# Patient Record
Sex: MALE | Race: OTHER | Employment: UNEMPLOYED | URBAN - METROPOLITAN AREA
[De-identification: names, ages, dates, MRNs, and addresses within clinical notes are randomized per-mention and may not be internally consistent; named-entity substitution may affect disease eponyms.]

---

## 2018-06-03 ENCOUNTER — HOSPITAL ENCOUNTER (EMERGENCY)
Facility: HOSPITAL | Age: 18
Discharge: HOME | End: 2018-06-03
Attending: EMERGENCY MEDICINE
Payer: MEDICAID

## 2018-06-03 ENCOUNTER — APPOINTMENT (OUTPATIENT)
Dept: CT IMAGING | Facility: HOSPITAL | Age: 18
End: 2018-06-03
Attending: EMERGENCY MEDICINE
Payer: MEDICAID

## 2018-06-03 VITALS
HEIGHT: 67 IN | SYSTOLIC BLOOD PRESSURE: 111 MMHG | RESPIRATION RATE: 16 BRPM | OXYGEN SATURATION: 97 % | HEART RATE: 80 BPM | DIASTOLIC BLOOD PRESSURE: 68 MMHG | BODY MASS INDEX: 23.54 KG/M2 | WEIGHT: 150 LBS | TEMPERATURE: 98 F

## 2018-06-03 DIAGNOSIS — J01.00 ACUTE NON-RECURRENT MAXILLARY SINUSITIS: Primary | ICD-10-CM

## 2018-06-03 PROCEDURE — 96375 TX/PRO/DX INJ NEW DRUG ADDON: CPT

## 2018-06-03 PROCEDURE — 96374 THER/PROPH/DIAG INJ IV PUSH: CPT

## 2018-06-03 PROCEDURE — 99284 EMERGENCY DEPT VISIT MOD MDM: CPT

## 2018-06-03 PROCEDURE — 70450 CT HEAD/BRAIN W/O DYE: CPT | Performed by: EMERGENCY MEDICINE

## 2018-06-03 PROCEDURE — 87430 STREP A AG IA: CPT

## 2018-06-03 PROCEDURE — 96361 HYDRATE IV INFUSION ADD-ON: CPT

## 2018-06-03 RX ORDER — DIPHENHYDRAMINE HYDROCHLORIDE 50 MG/ML
25 INJECTION INTRAMUSCULAR; INTRAVENOUS ONCE
Status: COMPLETED | OUTPATIENT
Start: 2018-06-03 | End: 2018-06-03

## 2018-06-03 RX ORDER — FLUTICASONE PROPIONATE 50 MCG
2 SPRAY, SUSPENSION (ML) NASAL DAILY
Qty: 16 G | Refills: 0 | Status: SHIPPED | OUTPATIENT
Start: 2018-06-03 | End: 2018-07-03

## 2018-06-03 RX ORDER — KETOROLAC TROMETHAMINE 15 MG/ML
15 INJECTION, SOLUTION INTRAMUSCULAR; INTRAVENOUS ONCE
Status: COMPLETED | OUTPATIENT
Start: 2018-06-03 | End: 2018-06-03

## 2018-06-03 RX ORDER — METOCLOPRAMIDE HYDROCHLORIDE 5 MG/ML
10 INJECTION INTRAMUSCULAR; INTRAVENOUS ONCE
Status: COMPLETED | OUTPATIENT
Start: 2018-06-03 | End: 2018-06-03

## 2018-06-03 RX ORDER — AMOXICILLIN 500 MG/1
500 TABLET, FILM COATED ORAL 3 TIMES DAILY
Qty: 30 TABLET | Refills: 0 | Status: SHIPPED | OUTPATIENT
Start: 2018-06-03 | End: 2018-06-13

## 2018-06-03 RX ORDER — MIRTAZAPINE 30 MG/1
30 TABLET, FILM COATED ORAL NIGHTLY
COMMUNITY
End: 2020-11-10

## 2018-06-03 NOTE — ED NOTES
Pt states the headache came on yesterday morning when he woke up and it has gradually gotten worse to the point he feels like he can't \"even think straight\". Pt took motrin but had no relief.

## 2018-06-03 NOTE — ED PROVIDER NOTES
Patient Seen in: [facility] AND [facility] Emergency Department    History   Patient presents with:  Headache (neurologic)    Stated Complaint: Headache    HPI    38-year-old male presents emergency department with headache that began at about noon and has progr Lymphadenopathy:     He has no cervical adenopathy. Neurological: He is alert and oriented to person, place, and time. No cranial nerve deficit. Skin: Skin is warm and dry. Psychiatric: He has a normal mood and affect.  His behavior is normal.   Gigi

## 2018-07-31 ENCOUNTER — HOSPITAL ENCOUNTER (EMERGENCY)
Facility: HOSPITAL | Age: 18
Discharge: HOME | End: 2018-08-01
Attending: EMERGENCY MEDICINE
Payer: MEDICAID

## 2018-07-31 ENCOUNTER — APPOINTMENT (OUTPATIENT)
Dept: CT IMAGING | Facility: HOSPITAL | Age: 18
End: 2018-07-31
Attending: EMERGENCY MEDICINE
Payer: MEDICAID

## 2018-07-31 DIAGNOSIS — R59.1 LAD (LYMPHADENOPATHY): Primary | ICD-10-CM

## 2018-07-31 LAB
ANION GAP SERPL CALC-SCNC: 7 MMOL/L (ref 0–18)
BASOPHILS # BLD: 0 K/UL (ref 0–0.2)
BASOPHILS NFR BLD: 0 %
BUN SERPL-MCNC: 10 MG/DL (ref 8–20)
BUN/CREAT SERPL: 12.2 (ref 10–20)
CALCIUM SERPL-MCNC: 9 MG/DL (ref 8.5–10.5)
CHLORIDE SERPL-SCNC: 102 MMOL/L (ref 95–110)
CO2 SERPL-SCNC: 27 MMOL/L (ref 22–32)
CREAT SERPL-MCNC: 0.82 MG/DL (ref 0.5–1.5)
EOSINOPHIL # BLD: 0.1 K/UL (ref 0–0.7)
EOSINOPHIL NFR BLD: 1 %
ERYTHROCYTE [DISTWIDTH] IN BLOOD BY AUTOMATED COUNT: 14 % (ref 11–15)
GLUCOSE SERPL-MCNC: 89 MG/DL (ref 70–99)
HCT VFR BLD AUTO: 48.4 % (ref 41–52)
HGB BLD-MCNC: 16.1 G/DL (ref 13.5–17.5)
LYMPHOCYTES # BLD: 2.1 K/UL (ref 1–4)
LYMPHOCYTES NFR BLD: 21 %
MCH RBC QN AUTO: 29.9 PG (ref 27–32)
MCHC RBC AUTO-ENTMCNC: 33.3 G/DL (ref 32–37)
MCV RBC AUTO: 90 FL (ref 80–100)
MONOCYTES # BLD: 0.7 K/UL (ref 0–1)
MONOCYTES NFR BLD: 7 %
NEUTROPHILS # BLD AUTO: 7.1 K/UL (ref 1.8–7.7)
NEUTROPHILS NFR BLD: 71 %
OSMOLALITY UR CALC.SUM OF ELEC: 281 MOSM/KG (ref 275–295)
PLATELET # BLD AUTO: 217 K/UL (ref 140–400)
PMV BLD AUTO: 9.6 FL (ref 7.4–10.3)
POTASSIUM SERPL-SCNC: 3.9 MMOL/L (ref 3.3–5.1)
RBC # BLD AUTO: 5.37 M/UL (ref 4.5–5.9)
SODIUM SERPL-SCNC: 136 MMOL/L (ref 136–144)
WBC # BLD AUTO: 10 K/UL (ref 4–11)

## 2018-07-31 PROCEDURE — 99284 EMERGENCY DEPT VISIT MOD MDM: CPT

## 2018-07-31 PROCEDURE — 85025 COMPLETE CBC W/AUTO DIFF WBC: CPT | Performed by: EMERGENCY MEDICINE

## 2018-07-31 PROCEDURE — 80048 BASIC METABOLIC PNL TOTAL CA: CPT | Performed by: EMERGENCY MEDICINE

## 2018-07-31 PROCEDURE — 70491 CT SOFT TISSUE NECK W/DYE: CPT | Performed by: EMERGENCY MEDICINE

## 2018-07-31 PROCEDURE — 36415 COLL VENOUS BLD VENIPUNCTURE: CPT

## 2018-07-31 RX ORDER — IBUPROFEN 600 MG/1
600 TABLET ORAL EVERY 8 HOURS PRN
Qty: 20 TABLET | Refills: 0 | Status: SHIPPED | OUTPATIENT
Start: 2018-07-31 | End: 2018-08-07

## 2018-07-31 RX ORDER — ACETAMINOPHEN 500 MG
1000 TABLET ORAL ONCE
Status: COMPLETED | OUTPATIENT
Start: 2018-07-31 | End: 2018-07-31

## 2018-08-01 VITALS
OXYGEN SATURATION: 97 % | RESPIRATION RATE: 18 BRPM | SYSTOLIC BLOOD PRESSURE: 112 MMHG | HEART RATE: 76 BPM | BODY MASS INDEX: 24 KG/M2 | DIASTOLIC BLOOD PRESSURE: 70 MMHG | TEMPERATURE: 98 F | WEIGHT: 155 LBS

## 2018-08-01 NOTE — ED PROVIDER NOTES
Patient Seen in: [facility] AND [facility] Emergency Department    History   Patient presents with:  Rash Skin Problem (integumentary)  Swelling Edema (cardiovascular, metabolic)    Stated Complaint: lump on throat, rash    HPI    24-year-old male with no signif stated complaint: lump on throat, rash  Other systems are as noted in HPI. Constitutional and vital signs reviewed. All other systems reviewed and negative except as noted above.     Physical Exam   ED Triage Vitals [07/31/18 2141]  BP: 120/72  Pulse: is 99%, indicating adequate oxygenation.     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 Case discussed with Dr. Nazia Pérez at 1:15 AM EDT. Silverio Martinez M.D.   This report has been electronically signed and verified by the Radiologist whose name is printed ab Prescribed:  Current Discharge Medication List    START taking these medications    ibuprofen 600 MG Oral Tab  Take 1 tablet (600 mg total) by mouth every 8 (eight) hours as needed.   Qty: 20 tablet Refills: 0

## 2018-08-01 NOTE — ED INITIAL ASSESSMENT (HPI)
Triage:  Patient c/o multiple new nodules on neck and head. States he has a lump on throat/side of neck, behind his R ear and back of head. C/o headaches. Also with diffuse rash for the past 1-2 weeks.

## 2018-12-29 ENCOUNTER — HOSPITAL ENCOUNTER (EMERGENCY)
Facility: HOSPITAL | Age: 18
Discharge: HOME | End: 2018-12-29
Attending: EMERGENCY MEDICINE
Payer: MEDICAID

## 2018-12-29 ENCOUNTER — APPOINTMENT (OUTPATIENT)
Dept: GENERAL RADIOLOGY | Facility: HOSPITAL | Age: 18
End: 2018-12-29
Attending: EMERGENCY MEDICINE
Payer: MEDICAID

## 2018-12-29 VITALS
BODY MASS INDEX: 24.48 KG/M2 | WEIGHT: 156 LBS | HEIGHT: 67 IN | OXYGEN SATURATION: 96 % | TEMPERATURE: 99 F | HEART RATE: 116 BPM | DIASTOLIC BLOOD PRESSURE: 77 MMHG | SYSTOLIC BLOOD PRESSURE: 112 MMHG | RESPIRATION RATE: 18 BRPM

## 2018-12-29 DIAGNOSIS — J40 BRONCHITIS: Primary | ICD-10-CM

## 2018-12-29 LAB — S PYO AG THROAT QL: NEGATIVE

## 2018-12-29 PROCEDURE — 99284 EMERGENCY DEPT VISIT MOD MDM: CPT

## 2018-12-29 PROCEDURE — 94640 AIRWAY INHALATION TREATMENT: CPT

## 2018-12-29 PROCEDURE — 87430 STREP A AG IA: CPT

## 2018-12-29 PROCEDURE — 71046 X-RAY EXAM CHEST 2 VIEWS: CPT | Performed by: EMERGENCY MEDICINE

## 2018-12-29 RX ORDER — BENZONATATE 100 MG/1
100 CAPSULE ORAL 3 TIMES DAILY PRN
Qty: 42 CAPSULE | Refills: 0 | Status: SHIPPED | OUTPATIENT
Start: 2018-12-29 | End: 2019-01-12

## 2018-12-29 RX ORDER — IPRATROPIUM BROMIDE AND ALBUTEROL SULFATE 2.5; .5 MG/3ML; MG/3ML
3 SOLUTION RESPIRATORY (INHALATION) ONCE
Status: COMPLETED | OUTPATIENT
Start: 2018-12-29 | End: 2018-12-29

## 2018-12-29 RX ORDER — ACETAMINOPHEN AND CODEINE PHOSPHATE 300; 30 MG/1; MG/1
1 TABLET ORAL ONCE
Status: COMPLETED | OUTPATIENT
Start: 2018-12-29 | End: 2018-12-29

## 2018-12-29 RX ORDER — LIDOCAINE HYDROCHLORIDE 10 MG/ML
5 INJECTION, SOLUTION EPIDURAL; INFILTRATION; INTRACAUDAL; PERINEURAL ONCE
Status: COMPLETED | OUTPATIENT
Start: 2018-12-29 | End: 2018-12-29

## 2018-12-29 RX ORDER — PREDNISONE 20 MG/1
40 TABLET ORAL DAILY
Qty: 10 TABLET | Refills: 0 | Status: SHIPPED | OUTPATIENT
Start: 2018-12-29 | End: 2019-01-03

## 2018-12-29 RX ORDER — ALBUTEROL SULFATE 90 UG/1
2 AEROSOL, METERED RESPIRATORY (INHALATION) EVERY 4 HOURS PRN
Qty: 1 INHALER | Refills: 0 | Status: SHIPPED | OUTPATIENT
Start: 2018-12-29 | End: 2019-01-28

## 2018-12-29 RX ORDER — ACETAMINOPHEN AND CODEINE PHOSPHATE 300; 30 MG/1; MG/1
1 TABLET ORAL EVERY 8 HOURS PRN
Qty: 15 TABLET | Refills: 0 | Status: SHIPPED | OUTPATIENT
Start: 2018-12-29 | End: 2019-01-03

## 2018-12-30 NOTE — ED PROVIDER NOTES
Patient Seen in: [facility] AND [facility] Emergency Department    History   Patient presents with:  Cough/URI    Stated Complaint: cough     HPI    18 yo M without PMH aside from prior tobacco use presneitng with 1+ week of intermittently productive cough/dyspnea Eyes: No injection. Neck: Neck supple. Cardiovascular: Tachycardic. Pulmonary/Chest: Effort normal. Diffuse expiratory wheezing and bronchospastic cough without respiratory distress. Abdominal: Soft. Nontender  Musculoskeletal: No gross deformity. [id number]  Call  For followup and re-evaluation. Deanna Taveras [address] [place] [address]  [phone]    Call  For primary care followup, re-evaluation and to establish care.       Medications Prescribed:  Discharge M

## 2018-12-30 NOTE — ED INITIAL ASSESSMENT (HPI)
Pt to ER with c/o persistent cough for one week. Pt states he finished course of Amoxicillin. Pt c/o intermittent fever of 101. Pt c/o back pain with cough and post tussive emesis. Lungs clear bilaterally. 98% on room air.

## 2018-12-30 NOTE — ED NOTES
Pt c/o cough, fevers, body aches, and sore throat x2wk. Pt states that he was taking amoxicillin for the past week for diarrhea, and has finished the course. Pt denies taking any medication for his sx today. Pt has dry cough while RN is in the room.  When n

## 2019-01-02 NOTE — ED NOTES
Pt provided with discharge instructions and prescriptions. Verbalized understanding for plan of care at home and follow up. All questions/concerns addressed prior to discharge.    Pt ambulatory out of er with steady gait Detail Level: Detailed General Sunscreen Counseling: I recommended a broad spectrum sunscreen with a SPF of 30 or higher.  I explained that SPF 30 sunscreens block approximately 97 percent of the sun's harmful rays.  Sunscreens should be applied at least 15 minutes prior to expected sun exposure and then every 2 hours after that as long as sun exposure continues. If swimming or exercising sunscreen should be reapplied every 45 minutes to an hour after getting wet or sweating.  One ounce, or the equivalent of a shot glass full of sunscreen, is adequate to protect the skin not covered by a bathing suit. I also recommended a lip balm with a sunscreen as well. Sun protective clothing can be used in lieu of sunscreen but must be worn the entire time you are exposed to the sun's rays.

## 2019-08-06 ENCOUNTER — HOSPITAL ENCOUNTER (EMERGENCY)
Facility: HOSPITAL | Age: 19
Discharge: HOME | End: 2019-08-06
Attending: EMERGENCY MEDICINE
Payer: MEDICAID

## 2019-08-06 VITALS
HEART RATE: 78 BPM | WEIGHT: 150 LBS | OXYGEN SATURATION: 100 % | DIASTOLIC BLOOD PRESSURE: 69 MMHG | RESPIRATION RATE: 20 BRPM | TEMPERATURE: 99 F | HEIGHT: 67 IN | BODY MASS INDEX: 23.54 KG/M2 | SYSTOLIC BLOOD PRESSURE: 127 MMHG

## 2019-08-06 DIAGNOSIS — S22.42XD CLOSED FRACTURE OF MULTIPLE RIBS OF LEFT SIDE WITH ROUTINE HEALING, SUBSEQUENT ENCOUNTER: Primary | ICD-10-CM

## 2019-08-06 PROCEDURE — 99283 EMERGENCY DEPT VISIT LOW MDM: CPT

## 2019-08-06 PROCEDURE — 96372 THER/PROPH/DIAG INJ SC/IM: CPT

## 2019-08-06 RX ORDER — LIDOCAINE 50 MG/G
1 PATCH TOPICAL EVERY 24 HOURS
Qty: 3 PATCH | Refills: 0 | Status: SHIPPED | OUTPATIENT
Start: 2019-08-06 | End: 2019-08-09

## 2019-08-06 RX ORDER — IBUPROFEN 600 MG/1
600 TABLET ORAL EVERY 6 HOURS PRN
Qty: 30 TABLET | Refills: 0 | Status: SHIPPED | OUTPATIENT
Start: 2019-08-06 | End: 2019-08-13

## 2019-08-06 RX ORDER — HYDROCODONE BITARTRATE AND ACETAMINOPHEN 5; 325 MG/1; MG/1
1-2 TABLET ORAL EVERY 6 HOURS PRN
Qty: 30 TABLET | Refills: 0 | Status: SHIPPED | OUTPATIENT
Start: 2019-08-06 | End: 2019-08-13

## 2019-08-06 RX ORDER — MORPHINE SULFATE 4 MG/ML
10 INJECTION, SOLUTION INTRAMUSCULAR; INTRAVENOUS ONCE
Status: COMPLETED | OUTPATIENT
Start: 2019-08-06 | End: 2019-08-06

## 2019-08-06 RX ORDER — IBUPROFEN 600 MG/1
600 TABLET ORAL ONCE
Status: COMPLETED | OUTPATIENT
Start: 2019-08-06 | End: 2019-08-06

## 2019-08-06 RX ORDER — MORPHINE SULFATE 4 MG/ML
10 INJECTION, SOLUTION INTRAMUSCULAR; INTRAVENOUS ONCE
Status: DISCONTINUED | OUTPATIENT
Start: 2019-08-06 | End: 2019-08-06

## 2019-08-06 NOTE — ED INITIAL ASSESSMENT (HPI)
Pt with recent left rib fracture and pneumothorax. Dc from hospital Friday with [place]. Pt ran out of PaperV last night. C/o left sided rib pain. Denies sob, speaking in full clear sentences. LS clear bilaterally.

## 2019-08-06 NOTE — ED PROVIDER NOTES
Patient Seen in: [facility] AND [facility] Emergency Department    History   Patient presents with:  Pain (neurologic)    Stated Complaint: Pain from Rib fract.     HPI    21 yo male was recently admitted to outside hospital with left sided rib fractures and pneu Bowel sounds are normal. He exhibits no distension and no mass. There is no tenderness. There is no rebound and no guarding. Musculoskeletal: Normal range of motion. He exhibits no edema or tenderness.    Lymphadenopathy:     He has no cervical adenopathy

## 2020-04-15 ENCOUNTER — HOSPITAL ENCOUNTER (EMERGENCY)
Facility: HOSPITAL | Age: 20
Discharge: HOME | End: 2020-04-15
Attending: EMERGENCY MEDICINE
Payer: MEDICAID

## 2020-04-15 VITALS
SYSTOLIC BLOOD PRESSURE: 106 MMHG | HEART RATE: 69 BPM | BODY MASS INDEX: 23.49 KG/M2 | RESPIRATION RATE: 20 BRPM | WEIGHT: 155 LBS | OXYGEN SATURATION: 97 % | HEIGHT: 68 IN | DIASTOLIC BLOOD PRESSURE: 74 MMHG | TEMPERATURE: 98 F

## 2020-04-15 DIAGNOSIS — G47.00 INSOMNIA, UNSPECIFIED TYPE: ICD-10-CM

## 2020-04-15 DIAGNOSIS — R20.2 PARESTHESIAS: ICD-10-CM

## 2020-04-15 DIAGNOSIS — R00.2 PALPITATIONS: Primary | ICD-10-CM

## 2020-04-15 PROCEDURE — 93010 ELECTROCARDIOGRAM REPORT: CPT | Performed by: EMERGENCY MEDICINE

## 2020-04-15 PROCEDURE — 84443 ASSAY THYROID STIM HORMONE: CPT | Performed by: EMERGENCY MEDICINE

## 2020-04-15 PROCEDURE — 99283 EMERGENCY DEPT VISIT LOW MDM: CPT

## 2020-04-15 PROCEDURE — 85025 COMPLETE CBC W/AUTO DIFF WBC: CPT | Performed by: EMERGENCY MEDICINE

## 2020-04-15 PROCEDURE — 80048 BASIC METABOLIC PNL TOTAL CA: CPT | Performed by: EMERGENCY MEDICINE

## 2020-04-15 PROCEDURE — 36415 COLL VENOUS BLD VENIPUNCTURE: CPT

## 2020-04-15 PROCEDURE — 93005 ELECTROCARDIOGRAM TRACING: CPT

## 2020-04-15 NOTE — ED INITIAL ASSESSMENT (HPI)
Heart palpitations 2 days ago; Patient report \"everytime I close my eyes my hands get numb and I feel like I'm going to pass out. \" Also reports dizziness when he gets up.

## 2020-04-15 NOTE — ED PROVIDER NOTES
Patient Seen in: [facility] Emergency Department    History   Patient presents with:  Lightheadedness  Arrythmia/Palpitations    Stated Complaint: palpitations, lightheaded    HPI    Patient is here with complaint of palpitations he states for the Wt 70.3 kg   SpO2 98%   BMI 23.57 kg/m²         Physical Exam  Constitutional:  Alert, well nourished adult lying in bed in no distress. Vital signs noted. Eye:  No scleral icterus. Eyelids appear normal, no lesions.   Cardiovascular:  Normal S1 and S2 order CBC WITH DIFFERENTIAL WITH PLATELET.   Procedure                               Abnormality         Status                     ---------                               -----------         ------                     CBC W/ DIFFERENTIAL[[id number]]

## 2020-05-15 ENCOUNTER — HOSPITAL ENCOUNTER (EMERGENCY)
Facility: HOSPITAL | Age: 20
Discharge: HOME | End: 2020-05-15
Attending: EMERGENCY MEDICINE
Payer: MEDICAID

## 2020-05-15 VITALS
SYSTOLIC BLOOD PRESSURE: 137 MMHG | TEMPERATURE: 100 F | HEIGHT: 68 IN | RESPIRATION RATE: 20 BRPM | HEART RATE: 104 BPM | WEIGHT: 150 LBS | DIASTOLIC BLOOD PRESSURE: 81 MMHG | BODY MASS INDEX: 22.73 KG/M2 | OXYGEN SATURATION: 98 %

## 2020-05-15 DIAGNOSIS — Z20.822 CLOSE EXPOSURE TO COVID-19 VIRUS: Primary | ICD-10-CM

## 2020-05-15 PROCEDURE — 87430 STREP A AG IA: CPT

## 2020-05-15 PROCEDURE — 99283 EMERGENCY DEPT VISIT LOW MDM: CPT

## 2020-05-15 RX ORDER — ACETAMINOPHEN 500 MG
1000 TABLET ORAL ONCE
Status: COMPLETED | OUTPATIENT
Start: 2020-05-15 | End: 2020-05-15

## 2020-05-15 NOTE — ED INITIAL ASSESSMENT (HPI)
C/o sore throat, chills, cough since Monday. Pt family member tested +COVID on Sunday. No respiratory distress. Taking Mucinex at home without relief. Mask applied to patient in triage.

## 2020-05-15 NOTE — ED NOTES
Discharge instructions reviewed. Pt verbalized understanding with no further questions. Pain controlled. Steady gait. Speaking in full clear sentences at discharge. Pt aware to self quarantine at home for 14 days.  Pt also aware to have family members who p

## 2020-05-15 NOTE — ED PROVIDER NOTES
Patient Seen in: [facility] AND [facility] Emergency Department    History   Patient presents with:  Sore Throat  Testing    Stated Complaint: sore throat, chills, COVID exposure     HPI    41-year-old male with PMH asthma/smoking presenting for evaluation of sore Cardiovascular: RRR. Pulmonary/Chest: Effort normal. CTAB. Abdominal: Soft. Nontender. Musculoskeletal: No gross deformity. Neurological: Alert. Skin: Skin is warm. Psychiatric: Cooperative. Nursing note and vitals reviewed.         ED Course

## 2020-11-10 ENCOUNTER — HOSPITAL ENCOUNTER (EMERGENCY)
Facility: HOSPITAL | Age: 20
Discharge: HOME | End: 2020-11-10
Attending: EMERGENCY MEDICINE
Payer: MEDICAID

## 2020-11-10 VITALS
RESPIRATION RATE: 17 BRPM | HEIGHT: 68 IN | SYSTOLIC BLOOD PRESSURE: 132 MMHG | WEIGHT: 165 LBS | BODY MASS INDEX: 25.01 KG/M2 | HEART RATE: 87 BPM | DIASTOLIC BLOOD PRESSURE: 86 MMHG | OXYGEN SATURATION: 97 % | TEMPERATURE: 100 F

## 2020-11-10 DIAGNOSIS — U07.1 COVID-19 VIRUS INFECTION: Primary | ICD-10-CM

## 2020-11-10 PROCEDURE — 93010 ELECTROCARDIOGRAM REPORT: CPT | Performed by: EMERGENCY MEDICINE

## 2020-11-10 PROCEDURE — 99283 EMERGENCY DEPT VISIT LOW MDM: CPT

## 2020-11-10 PROCEDURE — 93005 ELECTROCARDIOGRAM TRACING: CPT

## 2020-11-10 RX ORDER — PREDNISONE 20 MG/1
40 TABLET ORAL DAILY
Qty: 10 TABLET | Refills: 0 | Status: SHIPPED | OUTPATIENT
Start: 2020-11-10 | End: 2020-11-15

## 2020-11-11 NOTE — ED INITIAL ASSESSMENT (HPI)
Pt to ED with c/o increasing left sided chest discomfort and dyspnea for 3 days. Pt states he was seen at [facility] last night for same complaint. Pt states they did a chest xray and has a pending COVID test. Pt states hx of asthma. No wheezing noted.  No resp

## 2020-11-11 NOTE — ED PROVIDER NOTES
Patient Seen in: [facility] Emergency Department    History   Patient presents with:  Difficulty Breathing      HPI    The patient presents to the ED complaining of chest discomfort and 3 days of shortness of breath and coughing.   He states that h room. Personal protective equipment including droplet mask, eye protection, and gloves were worn throughout the duration of the exam.  Handwashing was performed prior to and after the exam.  Stethoscope and any equipment used during my examination was william reports. Complicating Factors: The patient already has does not have a problem list on file. to contribute to the complexity of this ED evaluation. ED Course: Patient presents to the ED with COVID-19 symptoms.   Testing at [facility] showing a po

## 2021-09-07 ENCOUNTER — HOSPITAL ENCOUNTER (EMERGENCY)
Facility: HOSPITAL | Age: 21
Discharge: LEFT BEFORE DISCHARGE | End: 2021-09-07
Attending: EMERGENCY MEDICINE
Payer: MEDICAID

## 2021-09-07 ENCOUNTER — APPOINTMENT (OUTPATIENT)
Dept: CT IMAGING | Facility: HOSPITAL | Age: 21
End: 2021-09-07
Attending: EMERGENCY MEDICINE
Payer: MEDICAID

## 2021-09-07 VITALS
OXYGEN SATURATION: 98 % | HEIGHT: 68 IN | BODY MASS INDEX: 19.7 KG/M2 | RESPIRATION RATE: 16 BRPM | TEMPERATURE: 98 F | HEART RATE: 102 BPM | WEIGHT: 130 LBS | DIASTOLIC BLOOD PRESSURE: 72 MMHG | SYSTOLIC BLOOD PRESSURE: 116 MMHG

## 2021-09-07 DIAGNOSIS — E16.2 HYPOGLYCEMIA: ICD-10-CM

## 2021-09-07 DIAGNOSIS — G40.909 SEIZURE DISORDER (HCC): Primary | ICD-10-CM

## 2021-09-07 LAB
ANION GAP SERPL CALC-SCNC: 4 MMOL/L (ref 0–18)
BASOPHILS # BLD AUTO: 0.07 X10(3) UL (ref 0–0.2)
BASOPHILS NFR BLD AUTO: 0.7 %
BUN BLD-MCNC: 16 MG/DL (ref 7–18)
BUN/CREAT SERPL: 18 (ref 10–20)
CALCIUM BLD-MCNC: 8.4 MG/DL (ref 8.5–10.1)
CHLORIDE SERPL-SCNC: 107 MMOL/L (ref 98–112)
CO2 SERPL-SCNC: 30 MMOL/L (ref 21–32)
CREAT BLD-MCNC: 0.89 MG/DL
DEPRECATED RDW RBC AUTO: 41.4 FL (ref 35.1–46.3)
EOSINOPHIL # BLD AUTO: 0.1 X10(3) UL (ref 0–0.7)
EOSINOPHIL NFR BLD AUTO: 1 %
ERYTHROCYTE [DISTWIDTH] IN BLOOD BY AUTOMATED COUNT: 13.1 % (ref 11–15)
GLUCOSE BLD-MCNC: 97 MG/DL (ref 70–99)
GLUCOSE BLDC GLUCOMTR-MCNC: 82 MG/DL (ref 70–99)
GLUCOSE BLDC GLUCOMTR-MCNC: 89 MG/DL (ref 70–99)
HAV IGM SER QL: 2.3 MG/DL (ref 1.6–2.6)
HCT VFR BLD AUTO: 47.1 %
HGB BLD-MCNC: 16.2 G/DL
IMM GRANULOCYTES # BLD AUTO: 0.02 X10(3) UL (ref 0–1)
IMM GRANULOCYTES NFR BLD: 0.2 %
LYMPHOCYTES # BLD AUTO: 2.74 X10(3) UL (ref 1–4)
LYMPHOCYTES NFR BLD AUTO: 28.5 %
MCH RBC QN AUTO: 29.9 PG (ref 26–34)
MCHC RBC AUTO-ENTMCNC: 34.4 G/DL (ref 31–37)
MCV RBC AUTO: 86.9 FL
MONOCYTES # BLD AUTO: 0.6 X10(3) UL (ref 0.1–1)
MONOCYTES NFR BLD AUTO: 6.2 %
NEUTROPHILS # BLD AUTO: 6.1 X10 (3) UL (ref 1.5–7.7)
NEUTROPHILS # BLD AUTO: 6.1 X10(3) UL (ref 1.5–7.7)
NEUTROPHILS NFR BLD AUTO: 63.4 %
OSMOLALITY SERPL CALC.SUM OF ELEC: 293 MOSM/KG (ref 275–295)
PLATELET # BLD AUTO: 280 10(3)UL (ref 150–450)
POTASSIUM SERPL-SCNC: 3.6 MMOL/L (ref 3.5–5.1)
RBC # BLD AUTO: 5.42 X10(6)UL
SODIUM SERPL-SCNC: 141 MMOL/L (ref 136–145)
WBC # BLD AUTO: 9.6 X10(3) UL (ref 4–11)

## 2021-09-07 PROCEDURE — 70496 CT ANGIOGRAPHY HEAD: CPT | Performed by: EMERGENCY MEDICINE

## 2021-09-07 PROCEDURE — 36415 COLL VENOUS BLD VENIPUNCTURE: CPT

## 2021-09-07 PROCEDURE — 93005 ELECTROCARDIOGRAM TRACING: CPT

## 2021-09-07 PROCEDURE — 93010 ELECTROCARDIOGRAM REPORT: CPT | Performed by: EMERGENCY MEDICINE

## 2021-09-07 PROCEDURE — 82962 GLUCOSE BLOOD TEST: CPT

## 2021-09-07 PROCEDURE — 83735 ASSAY OF MAGNESIUM: CPT | Performed by: EMERGENCY MEDICINE

## 2021-09-07 PROCEDURE — 70498 CT ANGIOGRAPHY NECK: CPT | Performed by: EMERGENCY MEDICINE

## 2021-09-07 PROCEDURE — 80048 BASIC METABOLIC PNL TOTAL CA: CPT | Performed by: EMERGENCY MEDICINE

## 2021-09-07 PROCEDURE — 85025 COMPLETE CBC W/AUTO DIFF WBC: CPT | Performed by: EMERGENCY MEDICINE

## 2021-09-07 PROCEDURE — 99284 EMERGENCY DEPT VISIT MOD MDM: CPT

## 2021-09-07 NOTE — ED PROVIDER NOTES
Patient Seen in: [facility] Emergency Department      History   Patient presents with:  Seizure Disorder  Hypoglycemia    Stated Complaint: seizure/hypoglycemia    HPI/Subjective:   HPI    Pt is 18 yo M who arrives by EMS for possible seizure.   Pa PERRL, conjunctiva injected b/l  Neck: supple, non tender, no meningeal signs  CV: tachycardic, no murmurs  Resp: CTAB, no wheezes or retractions  Ab: soft, nontender, no distension  Extremities: FROM of all extremities, no cyanosis/clubbing/edema  Neuro: (CST): Geovanna Cortes MD on 9/07/2021 at 12:24 PM     Finalized by (CST): Geovanna Cortes MD on 9/07/2021 at 12:30 PM            Radiology exams  Viewed and reviewed by myself and findings discussed with patient including need for follow up       Medical Record

## 2021-09-07 NOTE — ED QUICK NOTES
Pt eloped with PIV still in arm. Security and [place] [employer] called. Security able to find pt still on hospital property and return him for IV removal. MD aware.

## 2021-09-07 NOTE — ED INITIAL ASSESSMENT (HPI)
[address] [facility] EMS, patient had a seizure in bed, was post ictal upon medic arrival  Also was noted to be hypoglycemia at 48 , was given dextrose    Awake and alert upon arrival. Denies taking any medications daily

## 2021-11-07 ENCOUNTER — HOSPITAL ENCOUNTER (EMERGENCY)
Facility: HOSPITAL | Age: 21
Discharge: HOME HEALTH | End: 2021-11-07
Attending: EMERGENCY MEDICINE
Payer: MEDICAID

## 2021-11-07 ENCOUNTER — APPOINTMENT (OUTPATIENT)
Dept: CT IMAGING | Facility: HOSPITAL | Age: 21
End: 2021-11-07
Attending: EMERGENCY MEDICINE
Payer: MEDICAID

## 2021-11-07 VITALS
WEIGHT: 136 LBS | SYSTOLIC BLOOD PRESSURE: 113 MMHG | HEART RATE: 96 BPM | OXYGEN SATURATION: 100 % | TEMPERATURE: 97 F | DIASTOLIC BLOOD PRESSURE: 75 MMHG | RESPIRATION RATE: 18 BRPM | BODY MASS INDEX: 20.61 KG/M2 | HEIGHT: 68 IN

## 2021-11-07 DIAGNOSIS — R10.30 INGUINAL PAIN, UNSPECIFIED LATERALITY: Primary | ICD-10-CM

## 2021-11-07 PROCEDURE — 99282 EMERGENCY DEPT VISIT SF MDM: CPT

## 2021-11-07 NOTE — ED PROVIDER NOTES
Patient Seen in: [facility] AND [facility] Emergency Department      History   Patient presents with:  Groin Pain    Stated Complaint: groin pain    Subjective:   HPI  19 yoM presents for evaluation of groin pain.   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      Tenderness: There is no abdominal tenderness. There is no right CVA tenderness or left CVA tenderness. Hernia: No hernia is present.    Genitourinary:     Penis: Normal.       Testes: Normal.   Musculoskeletal:         General: Normal range

## 2021-11-07 NOTE — ED QUICK NOTES
Attempted to start IV for the patient. Patient kept jerking his arm around and would not cooperate with  The placement of the IV.     Explained the need for the IV  But the patient continued to jerk his arm away, Pt started  that he was gong to leave [facility]

## 2021-11-09 ENCOUNTER — APPOINTMENT (OUTPATIENT)
Dept: CT IMAGING | Facility: HOSPITAL | Age: 21
DRG: 896 | End: 2021-11-09
Attending: EMERGENCY MEDICINE
Payer: MEDICAID

## 2021-11-09 ENCOUNTER — APPOINTMENT (OUTPATIENT)
Dept: GENERAL RADIOLOGY | Facility: HOSPITAL | Age: 21
DRG: 896 | End: 2021-11-09
Attending: EMERGENCY MEDICINE
Payer: MEDICAID

## 2021-11-09 ENCOUNTER — HOSPITAL ENCOUNTER (INPATIENT)
Facility: HOSPITAL | Age: 21
LOS: 9 days | Discharge: HOME | DRG: 896 | End: 2021-11-18
Attending: EMERGENCY MEDICINE | Admitting: INTERNAL MEDICINE
Payer: MEDICAID

## 2021-11-09 DIAGNOSIS — R56.9 SEIZURE (HCC): Primary | ICD-10-CM

## 2021-11-09 DIAGNOSIS — F19.10 POLYSUBSTANCE ABUSE (HCC): ICD-10-CM

## 2021-11-09 PROCEDURE — 71045 X-RAY EXAM CHEST 1 VIEW: CPT | Performed by: EMERGENCY MEDICINE

## 2021-11-09 PROCEDURE — 0BH17EZ INSERTION OF ENDOTRACHEAL AIRWAY INTO TRACHEA, VIA NATURAL OR ARTIFICIAL OPENING: ICD-10-PCS | Performed by: EMERGENCY MEDICINE

## 2021-11-09 PROCEDURE — 70450 CT HEAD/BRAIN W/O DYE: CPT | Performed by: EMERGENCY MEDICINE

## 2021-11-09 PROCEDURE — 5A1935Z RESPIRATORY VENTILATION, LESS THAN 24 CONSECUTIVE HOURS: ICD-10-PCS | Performed by: STUDENT IN AN ORGANIZED HEALTH CARE EDUCATION/TRAINING PROGRAM

## 2021-11-09 RX ORDER — LEVETIRACETAM 500 MG/5ML
1000 INJECTION, SOLUTION, CONCENTRATE INTRAVENOUS ONCE
Status: COMPLETED | OUTPATIENT
Start: 2021-11-09 | End: 2021-11-09

## 2021-11-09 RX ORDER — NALOXONE HYDROCHLORIDE 1 MG/ML
2 INJECTION INTRAMUSCULAR; INTRAVENOUS; SUBCUTANEOUS ONCE
Status: COMPLETED | OUTPATIENT
Start: 2021-11-09 | End: 2021-11-09

## 2021-11-09 RX ORDER — ETOMIDATE 2 MG/ML
INJECTION INTRAVENOUS
Status: DISPENSED
Start: 2021-11-09 | End: 2021-11-10

## 2021-11-09 RX ORDER — NALOXONE HYDROCHLORIDE 1 MG/ML
INJECTION INTRAMUSCULAR; INTRAVENOUS; SUBCUTANEOUS
Status: COMPLETED
Start: 2021-11-09 | End: 2021-11-09

## 2021-11-10 ENCOUNTER — APPOINTMENT (OUTPATIENT)
Dept: CV DIAGNOSTICS | Facility: HOSPITAL | Age: 21
DRG: 896 | End: 2021-11-10
Attending: INTERNAL MEDICINE
Payer: MEDICAID

## 2021-11-10 PROBLEM — F19.10 POLYSUBSTANCE ABUSE (HCC): Status: ACTIVE | Noted: 2021-11-10

## 2021-11-10 PROCEDURE — 93306 TTE W/DOPPLER COMPLETE: CPT | Performed by: INTERNAL MEDICINE

## 2021-11-10 PROCEDURE — 95816 EEG AWAKE AND DROWSY: CPT | Performed by: OTHER

## 2021-11-10 PROCEDURE — 99223 1ST HOSP IP/OBS HIGH 75: CPT | Performed by: OTHER

## 2021-11-10 RX ORDER — LORAZEPAM 2 MG/ML
INJECTION INTRAMUSCULAR
Status: COMPLETED
Start: 2021-11-10 | End: 2021-11-10

## 2021-11-10 RX ORDER — LEVETIRACETAM 500 MG/5ML
500 INJECTION, SOLUTION, CONCENTRATE INTRAVENOUS EVERY 12 HOURS
Status: DISCONTINUED | OUTPATIENT
Start: 2021-11-10 | End: 2021-11-11

## 2021-11-10 RX ORDER — HEPARIN SODIUM 5000 [USP'U]/ML
5000 INJECTION, SOLUTION INTRAVENOUS; SUBCUTANEOUS EVERY 8 HOURS SCHEDULED
Status: DISCONTINUED | OUTPATIENT
Start: 2021-11-10 | End: 2021-11-18

## 2021-11-10 RX ORDER — LORAZEPAM 2 MG/ML
2 INJECTION INTRAMUSCULAR EVERY 4 HOURS PRN
Status: DISCONTINUED | OUTPATIENT
Start: 2021-11-10 | End: 2021-11-16

## 2021-11-10 RX ORDER — ONDANSETRON 2 MG/ML
4 INJECTION INTRAMUSCULAR; INTRAVENOUS EVERY 6 HOURS PRN
Status: DISCONTINUED | OUTPATIENT
Start: 2021-11-10 | End: 2021-11-18

## 2021-11-10 RX ORDER — PROCHLORPERAZINE EDISYLATE 5 MG/ML
5 INJECTION INTRAMUSCULAR; INTRAVENOUS EVERY 8 HOURS PRN
Status: DISCONTINUED | OUTPATIENT
Start: 2021-11-10 | End: 2021-11-18

## 2021-11-10 RX ORDER — ACETAMINOPHEN 325 MG/1
650 TABLET ORAL EVERY 6 HOURS PRN
Status: DISCONTINUED | OUTPATIENT
Start: 2021-11-10 | End: 2021-11-18

## 2021-11-10 RX ORDER — DEXMEDETOMIDINE HYDROCHLORIDE 4 UG/ML
INJECTION, SOLUTION INTRAVENOUS CONTINUOUS
Status: DISCONTINUED | OUTPATIENT
Start: 2021-11-10 | End: 2021-11-16

## 2021-11-10 RX ORDER — SODIUM CHLORIDE 9 MG/ML
INJECTION, SOLUTION INTRAVENOUS CONTINUOUS
Status: DISCONTINUED | OUTPATIENT
Start: 2021-11-10 | End: 2021-11-11

## 2021-11-10 RX ORDER — DEXMEDETOMIDINE HYDROCHLORIDE 4 UG/ML
INJECTION, SOLUTION INTRAVENOUS
Status: COMPLETED
Start: 2021-11-10 | End: 2021-11-10

## 2021-11-10 NOTE — PROCEDURES
EEG report    REFERRING PHYSICIAN: Daniela Peña DO  PCP and phone number:  None Pcp  None    TECHNIQUE: 21 channels of EEG, 2 channels of EOG, and 1 channel of EKG were recorded utilizing the International 10/20 System.  The recording was performed

## 2021-11-10 NOTE — CONSULTS
[facility]    Report of Consultation    Aldo Ace Patient Status:  Inpatient    3/24/2000 MRN [id number]   Location [facility] 2W/SW Attending Marlene Washington, [address] [facility] Day # 0 PCP None Pcp     Date of Admission:  2021 infusion, 5-100 mcg/kg/min, Intravenous, Continuous      No medications prior to admission.       Allergies  Not on File    Review of Systems:   As in HPI, the rest of the 14 system review was done and was negative    Physical Exam:      11/10/21  0500 11/1 patient.     Juan Wright MD  11/10/2021

## 2021-11-10 NOTE — RESPIRATORY THERAPY NOTE
Progressing Pt received intubated AC/VC 12/400/+5/40%. ETT 7.5 secured 21@ lips. Bilateral BS auscultated. Suction provided as indicated. No changes made. No acute events on shift.

## 2021-11-10 NOTE — H&P
[facility] Patient Status:  Inpatient    3/24/2000 MRN [id number]   Location [facility] 2W/SW Attending Terri Gresham, [facility] Day # 0 PCP None Pcp     Date:  11/10/2021  Date of Admissio Lab Results   Component Value Date    WBC 16.7 (H) 11/10/2021    HGB 16.7 11/10/2021    HCT 49.9 11/10/2021    .0 11/10/2021    CREATSERUM 0.68 (L) 11/10/2021    BUN 15 11/10/2021     11/10/2021    K 4.3 11/10/2021     11/10/2021

## 2021-11-10 NOTE — CONSULTS
[address] CONSULT NOTE    Loc Hua Patient Status:  Inpatient    3/24/2000 MRN [id number]   Location [facility] 2W/SW Attending Mary Choi, [address] [facility] Day # 0 PCP None Pcp     Date of Admission:  2021  Date Yaquelin Chen (Mother)    Other Topics            Concern    None on file    Social History Narrative    None on file            Current Medications:  levETIRAcetam (KEPPRA) injection 500 mg, 500 mg, Intravenous, Q12H  0.9% NaCl infusion, , Intravenous, clubbing, cyanosis. or edema. Peripheral pulses present. Neurologic: Limited exam due to sedation  Psychiatric: Sedated   skin: Warm and dry.  No rash    Results:     Laboratory Data:  Lab Results   Component Value Date    WBC 16.7 (H) 11/10/2021    HGB 16

## 2021-11-10 NOTE — ED INITIAL ASSESSMENT (HPI)
Patient arrives via ems for witnessed seizure by friend. Unresponsive on arrival, pinpoint pupils. 2 dose of narcan given pta. Friend reporting hx meth use.

## 2021-11-10 NOTE — CONSULTS
Critical Care H&P/Consult     NAME: Sue Castellanos - ROOM: 444/136-Y - MRN: [id number] - Age: 19year old - :  3/24/2000    Date of Admission: 2021  6:05 PM  Admission Diagnosis: Seizure ([facility] [R56.9]  Polysubstance abuse ([facility] [F19.10]      [address]  acetaminophen (TYLENOL) tab 650 mg, 650 mg, Oral, Q6H PRN  ondansetron (ZOFRAN) injection 4 mg, 4 mg, Intravenous, Q6H PRN  Prochlorperazine Edisylate (COMPAZINE) injection 5 mg, 5 mg, Intravenous, Q8H PRN  LORazepam (ATIVAN) injection 2 mg, 2 mg, Int Labs   Lab 11/09/21  1807 11/09/21  1807 11/10/21  0426   RBC 5.97*   < > 5.65   HGB 17.8*   < > 16.7   HCT 53.9*   < > 49.9   MCV 90.3   < > 88.3   MCH 29.8   < > 29.6   MCHC 33.0   < > 33.5   RDW 13.2   < > 13.2   NEPRELIM 13.15*  --   --    WBC 17.2*

## 2021-11-10 NOTE — ED PROVIDER NOTES
Patient Seen in: [facility] AND [facility] Emergency Department      History   Patient presents with:  Seizures    Stated Complaint: eval-d, seizure    Subjective:   HPI    75-year-old male with history of prior ED visit for seizure activity and history of GHB a rhythm  Gastrointestinal:  abdomen is soft and non tender, no masses, bowel sounds normal  Neurological: Unresponsive  Skin: warm and dry, no rashes.   Musculoskeletal: neck is supple non tender        Extremities are symmetrical, full range of motion  Psyc orders. HEPATIC FUNCTION PANEL (7)   ETHYL ALCOHOL   ACETAMINOPHEN (TYLENOL), S   SALICYLATE, SERUM   RAINBOW DRAW BLUE   RAINBOW DRAW LAVENDER   RAINBOW DRAW LIGHT GREEN   RAINBOW DRAW GOLD     EKG    Rate, intervals and axes as noted on EKG Report.   Ra Admission           ICD-10-CM Noted POA    * (Principal) Seizure ([facility] [address] R56.9 11/9/2021 Unknown

## 2021-11-11 ENCOUNTER — APPOINTMENT (OUTPATIENT)
Dept: GENERAL RADIOLOGY | Facility: HOSPITAL | Age: 21
DRG: 896 | End: 2021-11-11
Attending: INTERNAL MEDICINE
Payer: MEDICAID

## 2021-11-11 PROBLEM — F39 EPISODIC MOOD DISORDER (HCC): Status: ACTIVE | Noted: 2021-11-11

## 2021-11-11 PROBLEM — F19.20 POLYSUBSTANCE DEPENDENCE (HCC): Status: ACTIVE | Noted: 2021-11-11

## 2021-11-11 PROBLEM — F05 DELIRIUM DUE TO ANOTHER MEDICAL CONDITION: Status: ACTIVE | Noted: 2021-11-11

## 2021-11-11 PROCEDURE — 90792 PSYCH DIAG EVAL W/MED SRVCS: CPT | Performed by: OTHER

## 2021-11-11 PROCEDURE — 71045 X-RAY EXAM CHEST 1 VIEW: CPT | Performed by: INTERNAL MEDICINE

## 2021-11-11 PROCEDURE — 99232 SBSQ HOSP IP/OBS MODERATE 35: CPT | Performed by: OTHER

## 2021-11-11 RX ORDER — DEXTROSE, SODIUM CHLORIDE, AND POTASSIUM CHLORIDE 5; .45; .075 G/100ML; G/100ML; G/100ML
INJECTION INTRAVENOUS CONTINUOUS
Status: DISCONTINUED | OUTPATIENT
Start: 2021-11-11 | End: 2021-11-11

## 2021-11-11 RX ORDER — HALOPERIDOL 5 MG/ML
2 INJECTION INTRAMUSCULAR EVERY 2 HOUR PRN
Status: DISCONTINUED | OUTPATIENT
Start: 2021-11-11 | End: 2021-11-18

## 2021-11-11 RX ORDER — DEXTROSE MONOHYDRATE 25 G/50ML
INJECTION, SOLUTION INTRAVENOUS
Status: COMPLETED
Start: 2021-11-11 | End: 2021-11-11

## 2021-11-11 RX ORDER — HALOPERIDOL 5 MG/ML
2 INJECTION INTRAMUSCULAR EVERY 6 HOURS SCHEDULED
Status: DISCONTINUED | OUTPATIENT
Start: 2021-11-12 | End: 2021-11-15

## 2021-11-11 RX ORDER — DEXTROSE AND SODIUM CHLORIDE 5; .9 G/100ML; G/100ML
INJECTION, SOLUTION INTRAVENOUS CONTINUOUS
Status: DISCONTINUED | OUTPATIENT
Start: 2021-11-11 | End: 2021-11-11

## 2021-11-11 NOTE — PROGRESS NOTES
[facility] Hospitalist Progress Note   CC: follow-up hospital admission - seizure    SUBJECTIVE:  Interval History:   - mild hypoglycemia - started on d5 infusion  - mostly somnolent but appears agitated when awoken  - states his name, not truly answering questio 16 15 14   MG  --   --  1.8   ALT  --   --  24   AST  --   --  40*   ALB  --   --  2.2*       Coagulation:   No results found for: PROTIME, INR, PTT         Assessment/Plan:        Karla Cedillo is a 18 yo M w/ PMHx of seizure, substance abuse who presented

## 2021-11-11 NOTE — PROGRESS NOTES
1215: This RN noticed pt pulling at restraints and entered the room. Pt demanding to walk and use restroom. This RN educated pt that he is very unstable, often falling asleep mid sentence, and not appropriate to get out of bed at this time.  Pt became enrag

## 2021-11-11 NOTE — PLAN OF CARE
Pt A&Ox1, pt very confused and aggressive at times. Pt believes he has been here for days and doesn't understand why he is in the hospital, reorientation frequently provided.  Pt frequently trying to pull/bite at restraints and remove equipment, climb out o ordered  Outcome: Progressing  Goal: Absence of seizures  Description: INTERVENTIONS  - Monitor for seizure activity  - Administer anti-seizure medications as ordered  - Monitor neurological status  Outcome: Progressing  Goal: Remains free of injury relate possible  - Assess the patient's physical comfort, circulation, skin condition, hydration, nutrition and elimination needs   - Reorient and redirection as needed  - Assess for the need to continue restraints  Outcome: Not Progressing     Problem: Delirium

## 2021-11-11 NOTE — PLAN OF CARE
Pt extubated in afternoon to NC, tolerating well. Pt A&Ox2, lethargic and confused. Reorientation provided throughout shift. Pt is often agitated, attempting to remove IV, diaz, and monitoring equipment. Pt follows commands at times.  Restraints and posey safety  and administer oxygen as ordered  - Monitor patient for seizure activity, document and report duration and description of seizure to MD/LIP  - If seizure occurs, turn patient to side and suction secretions as needed  - Reorient patient post seizure contributing factors to confusion (electrolyte disturbances, delirium, medications)  - Discontinue any unnecessary medical devices as soon as possible  - Assess the patient's physical comfort, circulation, skin condition, hydration, nutrition and eliminati

## 2021-11-11 NOTE — DIETARY NOTE
ADULT NUTRITION INITIAL ASSESSMENT    Pt is at high nutrition risk. Pt meets severe malnutrition criteria.       CRITERIA FOR MALNUTRITION DIAGNOSIS:  Criteria for severe malnutrition diagnosis: chronic illness related to wt loss greater than 20% in 1 year 1051)     Prn meds:   haloperidol lactate, acetaminophen, ondansetron, Prochlorperazine Edisylate, LORazepam    LABS: reviewed Low BG X 1 - improved since. POC: 101, 91 today.    Recent Labs     11/09/21  1807 11/10/21  0426 11/11/21  0755   * 89 64* diet education for weight gain/halt weight loss.    - Meals and snacks: NPO  - Medical Food SupplementsNPO  - Vitamin and mineral supplements: none  - Feeding assistance: NPO  - Nutrition education: not appropriate at this time  - Coordination of nutrition

## 2021-11-11 NOTE — PROGRESS NOTES
Critical Care Progress Note     Assessment / Plan:  1. Seizures  - keppra  - neurology following  2. Polysubstance abuse - tox screen positive for methamphetamines, GHB, cannabis  - psych eval once appropriate  3.  Encephalopathy - likely from substance abu

## 2021-11-11 NOTE — PLAN OF CARE
Patient is lethargic and oriented x 1, slept for most of the night. Requiring frequent reorientation. 1L nasal cannula on for lethargy. You with adequate output. Fluids infusing. Bilateral soft wrist and vest restraints in place for safety.  Sister and gi Initiate measures to prevent increased intracranial pressure  - Maintain blood pressure and fluid volume within ordered parameters to optimize cerebral perfusion and minimize risk of hemorrhage  - Monitor temperature, glucose, and sodium.  Initiate appropri

## 2021-11-11 NOTE — PROGRESS NOTES
[facility]                                                               PROGRESS NOTE      Patient Name: Orlando Santiago MRN: [id number]   : 3/24/2000 CSN: [id number] - nsr      Assessment and Plan:     Seizure ([address]  Unclear if seizure but no evidence of cardiac issues  No arrhythmia  Echo ok  No furthur cardiac rx.              Godwin Oneil MD  [address] [place] [address]  11/11/2021    l2

## 2021-11-11 NOTE — PROGRESS NOTES
[facility]    Progress note    Loc Hua Patient Status:  Inpatient    3/24/2000 MRN [id number]   Location [facility] 2W/SW Attending Mary Choi, [address] [facility] Day # 1 PCP None Pcp     Date of Admission:  2021  Date of C PRN  Dexmedetomidine HCl in NaCl (PRECEDEX) 400 MCG/100ML premix infusion, 0.2-1.5 mcg/kg/hr, Intravenous, Continuous  propofol (DIPRIVAN) infusion, 5-100 mcg/kg/min, Intravenous, Continuous      No medications prior to admission.       Allergies    Shrimp MD on 11/09/2021 at 7:20 PM     Finalized by (CST): Mary Ellen Holly MD on 11/09/2021 at 7:21 PM          EKG    Result Date: 11/9/2021  ECG Report  Interpretation  -------------------------- Sinus Rhythm -With rate variation nonconducted PAc ABNORM

## 2021-11-11 NOTE — BH PROGRESS NOTE
Psych Liaison attempted to see Deliciavarun Bennett for initial consultation at which time he was asleep and not arousal. Psych Liaison will attempt to see Delicia Bennett again later today and notified Dr. Julio Courtney and Dr. Elvira Barbosa of attempted consultation.     Jessica DOWLING, LPC

## 2021-11-12 PROCEDURE — 99232 SBSQ HOSP IP/OBS MODERATE 35: CPT | Performed by: OTHER

## 2021-11-12 PROCEDURE — 99222 1ST HOSP IP/OBS MODERATE 55: CPT | Performed by: INTERNAL MEDICINE

## 2021-11-12 NOTE — CONSULTS
[facility]    Report of Consultation    Cleveland Changangiebon Patient Status:  Inpatient    3/24/2000 MRN [id number]   Location [facility] 2W/SW Attending Binu Nugent, [address] [facility] Day # 2 PCP None Pcp     Date of Admission:  2021 infusion, 5-100 mcg/kg/min, Intravenous, Continuous    ROS very limited due to the patient's medical condition    Physical Exam:  Blood pressure 106/71, pulse 81, temperature 97.5 °F (36.4 °C), temperature source Temporal, resp.  rate 17, height 5' 10\" (1.

## 2021-11-12 NOTE — PROGRESS NOTES
Critical Care Progress Note     Assessment / Plan:  1. Acute respiratory failure - intubated for airway protection. Now with aspiration PNA. Extubated 11/10  - wean supplemental O2 as able  - antibiotics as below  2.  Aspiration pneumonia - CXR with right l never used

## 2021-11-12 NOTE — PLAN OF CARE
[place] Angelica was maintained in the soft wrist restraints, the vest and the mittens over night. The IV precedex was briefly used to keep him calm and relaxed. Haldol has been added as a scheduled med. While [place] Angelica is drowsy it is easier to provide care for him.  His Maintain blood pressure and fluid volume within ordered parameters to optimize cerebral perfusion and minimize risk of hemorrhage  - Monitor temperature, glucose, and sodium.  Initiate appropriate interventions as ordered  Outcome: Not Progressing  Goal: Ab Provide frequent reorientation  - Promote wakefulness i.e. lights on, blinds open  - Promote sleep, encourage patient's normal rest cycle i.e. lights off, TV off, minimize noise and interruptions  - Encourage family to assist in orientation and promotion o

## 2021-11-12 NOTE — CONSULTS
[facility] - [facility]    Report of Consultation    Anh Cooper Patient Status:  Inpatient    3/24/2000 MRN [id number]   Location [facility] 2W/SW Attending Pretty Reynolds, [address] [facility] Day # 1 PCP None Pcp     Date of Admission:  2021 Polysubstance abuse  2. Past psychiatric inpatient: Questionable  3. Past outpatient history: Questionable  4. Past suicide history: Questionable  5.  Medication history: No current medication    Social history:   Patient as we learn there is a single male CA 8.1 (L) 11/11/2021    ALB 2.2 (L) 11/11/2021    ALKPHO 67 11/11/2021    TP 6.2 (L) 11/11/2021    AST 40 (H) 11/11/2021    ALT 24 11/11/2021    TSH 0.833 11/10/2021    MG 1.8 11/11/2021         Imaging:  CT BRAIN OR HEAD ([id number])    Result Date: 11/9/2021 Limited  Judgment and Insight: Poor due to multiple substance abuse history    Impression:     Impression:        Delirium due to polysubstance overdose  Episodic mood disorder. Polysubstance dependence.   Seizure [facility])      The patient with history of alexi Dominic Perez MD  11/11/2021    This note was prepared using [employer] [facility] [employer] voice recognition dictation software and as a result, errors may occur. When identified, these errors have been corrected.  While every attempt is made to correct error

## 2021-11-12 NOTE — BH PROGRESS NOTE
Behavioral Health Note:  REASON FOR ADMISSION:   Seizure    REASON FOR CONSULT:  Psychiatry consultation requested for evaluation and advice d/t polysubstance abuse    CHIEF COMPLAINT:   Psych Liaison gathered collateral with Marko's sister, Gilbert Moulton by ph her brother reporting he is very meticulous with his grooming, she denies known hx of AVH or bipolar disorder dx, reporting that he doesn't necessarily have mood swings but gets irritable more easily when he is already anxious.     Alex Le denies SI/HI/SIB

## 2021-11-12 NOTE — PAYOR COMM NOTE
ADMITTED TO ICU INTUBATED    ADMISSION REVIEW     Payor: MIND[employer] [address] #:  [id number]  Authorization Number:  [id number]    Admit date: 11/10/21  Admit time: 12:01 AM       REVIEW DOCUMENTATION:     ED Provider Notes      ED Provider Notes signed by [facility] ED Triage Vitals [11/09/21 1808]   /87   Pulse 62   Resp 14   Temp    Temp src    SpO2 93 %   O2 Device None (Room air)       Current:BP (!) 116/95   Pulse 62   Resp 12   Wt 59 kg   SpO2 100%         Physical Exam      General Appearance: Heath Crick BY PCR - Normal   CBC WITH DIFFERENTIAL WITH PLATELET    Narrative: The following orders were created for panel order CBC With Differential With Platelet.   Procedure                               Abnormality         Status                     --------- tests and discussion with consultants but not including time spent performing procedures.                              Disposition and Plan     Clinical Impression:  Seizure ([facility]  (primary encounter diagnosis)  Polysubstance abuse ([facility]     Disposition:  A file      Smokeless tobacco: Not on file    Alcohol use: Not on file    Drug use: Not on file    Allergies/Medications: Allergies: Not on File  No medications prior to admission.       Review of Systems:   A comprehensive 12 point review of systems was ot to pain/anxiety/substance abuse  Polysubstance abuse     Plan  Seizure - ?  Related to substance abuse vs underlying epilepsy   - neuro cs d/w Dr. Warren Swenson for further eval once patient extubated, MRI B, Cachil DeHe, iv keppra      Acute respiratory failure 2/2 abo 7783 Given 5,000 Units Subcutaneous (Left Lower Abdomen) Charmaine Andrew RN    11/11/2021 2338 Given 5,000 Units Subcutaneous (Right Upper Arm) Charmaine Andrew RN    11/11/2021 1411 Given 5,000 Units Subcutaneous (Left Lower Abdomen) Ellen Carcamo 100 15 106/67 100 % — — — MG    11/12/21 0000 — 91 17 100/56 100 % — Nasal cannula 3 L/min MG    11/11/21 2300 — 94 16 99/62 100 % — — — MG    11/11/21 2200 — 85 19 86/56 100 % — — — MG    11/11/21 2100 — 93 12 93/56 100 % — — — MG    11/11/21 2000 98.3 °F pending  - Intubated for airway protection; extubate today   - Keppra  - Neuro precautions  2. Polysubstance abuse  - Urine positive for methamphetamines, GHB, cannabis  - Will need psych consult in future  - Monitor for withdrawal  3.  Encephalopathy  - Elnoria Poncho course     Recommendations:  As above    History of Present Illness:   Patient is a 19year old male who was admitted to the hospital for Seizure [facility]):   This is a 73-year-old with insignificant cardiac history who presented with seizures and we are asked benzodiazepines. No focal, lateralized, or epileptiform features are noted. 11/10 CARDIOLOGY NOTE  Objective:   Blood pressure 108/74, pulse 104, temperature 99.6 °F (37.6 °C), temperature source Temporal, resp.  rate 21, height 5' 10\" (1.778 m), weight examination or interview      Impression:     Seizure ([facility]  Unclear if this is a provoked seizure or has underlying epilepsy.   Patient is not willing to provide any further history.     Will continue with Keppra, EEG showed excessive beta activity consist extubated today.     It seems that he was working with his friends in the house when he was heard from another room and was found on the floor lying with sonorous aspiration, then became stiff and rigid for approximately minute, followed with some jerking pulmonary   - extubated   - wean o2 as tolerated      Leukocytosis   - most likely reactive  -  pct neg     Tachycardia - likely related to pain/anxiety/substance abuse  - monitor on telemetry  - cards consulted for ?nonconducted pac - echo okay - cards re appropriate clinical setting.     Dictated by (CST): Gagandeep George MD on 11/11/2021 at 7:01 PM     Finalized by (CST): Gagandeep George MD on 11/11/2021 at 7:02 PM               CT of the head was independently, no obvious acute changes.     Tayi Extubated 11/10  - wean supplemental O2 as able  - antibiotics as below  12. Aspiration pneumonia - CXR with right lower and mid lung consolidation. PCT is elevated  - Zosyn (11/11- )  13. Seizures  - Depakote  - neurology following  14.  Polysubstance abus

## 2021-11-12 NOTE — PROGRESS NOTES
[facility] - [facility]    Progress note    Saima Meals Patient Status:  Inpatient    3/24/2000 MRN [id number]   Location [facility] 2W/SW Attending Minerva Rowan, [address] [facility] Day # 2 PCP None Pcp     Date of Admission:  2021  Date of C 2 mg, 2 mg, Intravenous, 4 times per day  Piperacillin Sod-Tazobactam So (ZOSYN) 3.375 g in dextrose 5% 100 mL IVPB-MBP, 3.375 g, Intravenous, Q8H  Heparin Sodium (Porcine) 5000 UNIT/ML injection 5,000 Units, 5,000 Units, Subcutaneous, Q8H [address]  acetaminoph 11/12/2021    GLU 81 11/12/2021    CA 8.0 (L) 11/12/2021    ALB 2.2 (L) 11/11/2021    ALKPHO 67 11/11/2021    TP 6.2 (L) 11/11/2021    AST 40 (H) 11/11/2021    ALT 24 11/11/2021    TSH 0.833 11/10/2021    MG 2.0 11/12/2021    PHOS 2.2 (L) 11/12/2021

## 2021-11-12 NOTE — PROGRESS NOTES
Patient is a 19year old  single male with history of polysubstance abuse and previous history of seizure who presented to the hospital with seizure–like activity and unresponsiveness.      Consult Duration     The patient seen for over 25-minute appetite.     Elsie reports that Yvon Castillo has lost a lot of weight over the past couple of months, initially not knowing why but looking back now realizes it's likely d/t the meth use.  She reports that he has been using cannabis daily over the past month a past medical history on file. Past Surgical History  No past surgical history on file. Family History  No family history on file.     Social History  Social History    Tobacco Use      Smoking status: Not on file      Smokeless tobacco: Not on file TSH 0.833 11/10/2021    MG 2.0 11/12/2021    PHOS 2.2 (L) 11/12/2021         Imaging:  CT BRAIN OR HEAD ([id number])    Result Date: 11/9/2021  CONCLUSION:  1. Negative CT brain.     Dictated by (CST): Hans Holly MD on 11/09/2021 at 6:48 PM     F Delirium due to polysubstance overdose  Episodic mood disorder. Polysubstance dependence.   Seizure [facility])      The patient with history of polysubstance abuse who presented to the hospital with seizure resulted from excessive stimulation from [facility] this encounter           Orly Castillo MD  11/12/2021    This note was prepared using "[employer] voice recognition dictation software and as a result, errors may occur. When identified, these errors have been corrected.  While every attempt is made t

## 2021-11-12 NOTE — BH PROGRESS NOTE
Psych Liaison provided previously discussed LGBTQIA+ centered resources for [facility] directly to RNs Arpita and Winnifred Frankel outside of hospital room to be provided to his sister and mentor. Psych team to continue following throughout admission.      A

## 2021-11-12 NOTE — PROGRESS NOTES
Cardiology:  No new cardiac issues or arrhythmias. We will sign off and follow peripherally. Please call if problems or questions.     Adelia Santacruz MD [facility]

## 2021-11-12 NOTE — PLAN OF CARE
Patient restraints were continued due to him being confused and being at risk for removing medical equipment and falling out of bed. He remained in bed, NPO with accuchecks Q4 & no need for hypoglycemia management.  Patient will be provided with resources t Monitor temperature, glucose, and sodium.  Initiate appropriate interventions as ordered  Outcome: Progressing  Goal: Absence of seizures  Description: INTERVENTIONS  - Monitor for seizure activity  - Administer anti-seizure medications as ordered  - Ian Glynn lights off, TV off, minimize noise and interruptions  - Encourage family to assist in orientation and promotion of home routines  Outcome: Progressing     Problem: Diabetes/Glucose Control  Goal: Glucose maintained within prescribed range  Description: INT

## 2021-11-12 NOTE — PROGRESS NOTES
[facility] Hospitalist Progress Note   CC: follow-up hospital admission - seizure    SUBJECTIVE:  Interval History:   - ongoing hypoglycemia, leukocytosis, tachy, repeat work up revealed rll pna, likely aspiration  - somnolent  - moving all 4s    OBJECTIVE:  Sche 260.0 197.0 203.0 216.0       Chem:   Recent Labs   Lab 11/09/21  1807 11/10/21  0426 11/11/21  0755 11/11/21  1834 11/12/21  0323    140 137 138 142   K 3.9 4.3 3.9 3.8 4.0    110 107 104 109   CO2 22.0 26.0 24.0 29.0 28.0   BUN 16 15 14 9 8

## 2021-11-13 PROCEDURE — 99232 SBSQ HOSP IP/OBS MODERATE 35: CPT | Performed by: OTHER

## 2021-11-13 RX ORDER — DEXTROSE MONOHYDRATE 100 MG/ML
INJECTION, SOLUTION INTRAVENOUS CONTINUOUS
Status: DISCONTINUED | OUTPATIENT
Start: 2021-11-13 | End: 2021-11-15

## 2021-11-13 NOTE — PROGRESS NOTES
Another good  for Stone Villareal is friend/mentor, Monisha Zayas [phone] in addition to Lakhwinder's sister, [facility].

## 2021-11-13 NOTE — PROGRESS NOTES
[facility] Hospitalist Progress Note     CC: Hospital Follow up    PCP: None Pcp       Assessment/Plan:     Lb Major a 20 yo M w/ PMHx of seizure, substance abuse who presented w/ possible witnessed seizure and unresponsiveness, he was intubated and start clear, PERRLA, neck supple  Pulm: Lungs clear, normal respiratory effort  CV: Heart with regular rate and rhythm, no peripheral edema  Abd: Abdomen soft, nontender, nondistended, no organomegaly, bowel sounds present  MSK: Full range of motion in extremiti

## 2021-11-13 NOTE — CERTIFICATION
Ref: [facility] 5/3-403, 5/3-602, 5/3-607, 5/3-610    5/3-702, 5/3-813, 5/4-306, 5/4-402, 5/4-403    5/4-405, 5/4-501, 5/4-611, 8/6-685   Inpatient Certificate  Re: Karla Arango    (name)     I personally informed the above-named individual of the purpose or her behavioral history, to suffer mental or emotional deterioration and is reasonably expected, after such deterioration, to meet the criteria of either paragraph one or paragraph two above;   []  An individual who is developmentally disabled and unless

## 2021-11-13 NOTE — PLAN OF CARE
Lakhwinder's restraints are maintained for his safety.    Problem: Safety Risk - Non-Violent Restraints  Goal: Patient will remain free from self-harm  Description: INTERVENTIONS:  - Apply the least restrictive restraint to prevent harm  - Notify patient and fam

## 2021-11-13 NOTE — PROGRESS NOTES
Mahi Ekaterina Patient Status:  Inpatient    3/24/2000 MRN [id number]   Location [facility] 2W/SW Attending Lary Ochoa, [address] [facility] Day # 3 PCP None Pcp     Critical Care Progress Note     Date of Admission: 2021  6:05 PM  Admission  Physical Exam:                          General: lethargic, cooperative, in NAD                          HEENT: oropharynx clear without erythema or exudates, moist mucous membranes                          Lungs: Clear to auscultation bilaterally, n ADAT  9.  Dalila Peguero MD  11/13/2021  12:08 PM

## 2021-11-13 NOTE — PROGRESS NOTES
PSYCHIATRY      Record reviewed, discussed with staff, patient seen.   19 y.o. man with delirium, polysubstance abuse, pneumonia and s/p seizure requiring intubation (now extubated), wanting to leave hospital AMA  Patient's decisional capacity is impaired a

## 2021-11-13 NOTE — BH PROGRESS NOTE
Patient met with Dr. Ángel Saldivar for Telepsych services. Informed Consent for telemedicine services scanned into patients chart.

## 2021-11-13 NOTE — PLAN OF CARE
Patient is able to follow commands is staying in bed and is able to verbalize that he needs the equipment which he is wearing and is not attempting to take it off. Restraints discontinued at this moment. Will continue to monitor.   Problem: Safety Risk - No

## 2021-11-13 NOTE — PLAN OF CARE
Heather Paz \"wants to go home\" but it was explained that it isn't safe at this time. Encouraged him to talk with the physicians as they round today.     Problem: Safety Risk - Non-Violent Restraints  Goal: Patient will remain free from self-harm  Description: I Adequate for Discharge     Problem: RESPIRATORY - ADULT  Goal: Achieves optimal ventilation and oxygenation  Description: INTERVENTIONS:  - Assess for changes in respiratory status  - Assess for changes in mentation and behavior  - Position to facilitate o

## 2021-11-13 NOTE — PROGRESS NOTES
[facility]      Report of Psychiatric Follow-up Visit    Valentín Sequeira Patient Status:  Inpatient    3/24/2000 MRN [id number]   Location [facility] 2W/SW Attending Pawel Santiago MD   [facility] Day # 3 PCP None Pcp     Date of Ad file.  No past surgical history on file. No family history on file.        Allergies:    Shrimp                  ANAPHYLAXIS    Medications:    Current Facility-Administered Medications:   •  Valproate Sodium (DEPACON) 500 mg in sodium chloride 0.9% 100 mL 27.0 11/13/2021     11/13/2021    CA 7.6 11/13/2021    MG 1.9 11/13/2021    PHOS 3.2 11/13/2021    PGLU 95 11/13/2021       IMAGING:  XR CHEST AP PORTABLE  (CPT=71045)    Result Date: 11/11/2021  CONCLUSION:  Development of consolidation in the mid

## 2021-11-14 PROCEDURE — 99232 SBSQ HOSP IP/OBS MODERATE 35: CPT | Performed by: INTERNAL MEDICINE

## 2021-11-14 NOTE — PLAN OF CARE
Hank Jean Paulaudrey arouses to verbal stimuli, but he expresses frustration with being in the hospital. This a.m. Benjamin Davis he is especially frustrated that he \"is spending the whole weekend in the hospital\".  Reminded him that he is \"petitioned\" and so he must stay in hospit in mentation and behavior  - Position to facilitate oxygenation and minimize respiratory effort  - Oxygen supplementation based on oxygen saturation or ABGs  - Provide Smoking Cessation handout, if applicable  - Encourage broncho-pulmonary hygiene includin

## 2021-11-14 NOTE — PLAN OF CARE
Pt. Alert and Oriented x 3 but very soft spoken and does not engage in eye contact but is cooperative and following commands. Pt. VS are WNL. I encouraged patient to eat today and ate most breakfast and some lunch so I shut off D10 drip for now.  Pt able to Problem: RESPIRATORY - ADULT  Goal: Achieves optimal ventilation and oxygenation  Description: INTERVENTIONS:  - Assess for changes in respiratory status  - Assess for changes in mentation and behavior  - Position to facilitate oxygenation and minimize r

## 2021-11-14 NOTE — PROGRESS NOTES
[facility]    Progress Note    Gene Settles Patient Status:  Inpatient    3/24/2000 MRN [id number]   Location [facility] 2W/SW Attending Ranjith Vega MD   [facility] Day # 4 PCP None Pcp     Subjective:  Eating better  Today

## 2021-11-14 NOTE — PROGRESS NOTES
[facility] Hospitalist Progress Note     CC: Hospital Follow up    PCP: None Pcp       Assessment/Plan:     Myriam Merida a 20 yo M w/ PMHx of seizure, substance abuse who presented w/ possible witnessed seizure and unresponsiveness, he was intubated and start effort  CV: Heart with regular rate and rhythm, no peripheral edema  Abd: Abdomen soft, nontender, nondistended, no organomegaly, bowel sounds present  MSK: Full range of motion in extremities, no clubbing, no cyanosis  Skin: no rashes or lesions  Neuro: m

## 2021-11-14 NOTE — PROGRESS NOTES
Marycruz Mcnamara Patient Status:  Inpatient    3/24/2000 MRN [id number]   Location [facility] 2W/SW Attending Mildred Orozco, [address] [facility] Day # 4 PCP None Pcp     Critical Care Progress Note     Date of Admission: 2021  6:05 PM  Admission 120 [P.O.:120]  Out: 1500 [Urine:1500]      Physical Exam:                          General: awake, minimally interactive, in NAD                          HEENT: oropharynx clear without erythema or exudates, moist mucous membranes                          improving  6. Hypoglycemia:  - endo following  - trial off D10 and monitor sugars now that he is eating   7. Proph  - subcu heparin  8. FEN:   - general diet   9.  Dispo  - from pulmonary perspective ok to discharge on PO augmentin to complete the course, w

## 2021-11-14 NOTE — PROGRESS NOTES
[facility]    Progress Note    Shelli Sow Patient Status:  Inpatient    3/24/2000 MRN [id number]   Location [facility] 2W/SW Attending Eve Grijalva MD   Hosp Day # 5 PCP None Pcp     Subjective:  Eating better   Sleep

## 2021-11-15 PROCEDURE — 99233 SBSQ HOSP IP/OBS HIGH 50: CPT | Performed by: OTHER

## 2021-11-15 PROCEDURE — 99232 SBSQ HOSP IP/OBS MODERATE 35: CPT | Performed by: INTERNAL MEDICINE

## 2021-11-15 RX ORDER — DEXTROSE MONOHYDRATE 100 MG/ML
INJECTION, SOLUTION INTRAVENOUS CONTINUOUS
Status: DISCONTINUED | OUTPATIENT
Start: 2021-11-15 | End: 2021-11-18

## 2021-11-15 RX ORDER — ESCITALOPRAM OXALATE 10 MG/1
5 TABLET ORAL NIGHTLY
Status: DISCONTINUED | OUTPATIENT
Start: 2021-11-15 | End: 2021-11-18

## 2021-11-15 RX ORDER — HALOPERIDOL 5 MG/ML
0.5 INJECTION INTRAMUSCULAR EVERY 6 HOURS SCHEDULED
Status: DISCONTINUED | OUTPATIENT
Start: 2021-11-15 | End: 2021-11-16

## 2021-11-15 NOTE — CONSULTS
Patient is a 19year old  single male with history of polysubstance abuse and previous history of seizure who presented to the hospital with seizure–like activity and unresponsiveness.      Consult Duration     The patient seen for over 35-minute irritable and not cooperative in the conversation. Patient is not aware of what brought him into the hospital and stats that he is fine. Patient denies suicidal and homicidal ideation.   Patient denies hallucinations but upon examination patient demo (HALDOL) 5 MG/ML injection 2 mg, 2 mg, Intravenous, Q2H PRN  dextrose 10% sodium chloride 0.9% 1000 ml, , Intravenous, Continuous  Piperacillin Sod-Tazobactam So (ZOSYN) 3.375 g in dextrose 5% 100 mL IVPB-MBP, 3.375 g, Intravenous, Q8H  Heparin Sodium (Por Limited  Mood:  Fluctuate  Affect:  Irritable, flat  Thought process:   Tangential  Thought content:  Patient denies thoughts of suicide  Perceptions:  Noticeable response to internal stimuli  Concentration: Impaired  Memory: Impaired  Intellect: Limited  J Insulin      C-Peptide      Phosphorus      Rapid SARS-CoV-2 by PCR      Blood Culture      Meds This Visit:  Requested Prescriptions      No prescriptions requested or ordered in this encounter           Sampson Caballero MD  11/15/2021    This note was pr

## 2021-11-15 NOTE — PROGRESS NOTES
Pulmonary Progress Note     Assessment / Plan:  1. Acute respiratory failure - intubated for airway protection. Now with aspiration PNA. Extubated 11/10  - wean supplemental O2 as able, now on RA  - antibiotics as below  2.  Aspiration pneumonia - CXR with

## 2021-11-15 NOTE — PROGRESS NOTES
[facility] Hospitalist Progress Note     CC: Hospital Follow up    PCP: None Pcp       Assessment/Plan:     Yfn Levine a 20 yo M w/ PMHx of seizure, substance abuse who presented w/ possible witnessed seizure and unresponsiveness, he was intubated and start 0005 : 121 lb 4.1 oz (55 kg)  11/09/21 1808 : 130 lb (59 kg)      Exam:  Gen: Lethargic, wakes to verbal stimuli, minimally interactive  Heent: NC AT, mucous memb clear, PERRLA, neck supple  Pulm: Lungs clear, normal respiratory effort  CV: Heart with regu

## 2021-11-15 NOTE — PLAN OF CARE
Problem: Patient Centered Care  Goal: Patient preferences are identified and integrated in the patient's plan of care  Description: Interventions:  - What would you like us to know as we care for you?  Keep patient informed of plan of care- Provide timely oxygenation  Description: INTERVENTIONS:  - Assess for changes in respiratory status  - Assess for changes in mentation and behavior  - Position to facilitate oxygenation and minimize respiratory effort  - Oxygen supplementation based on oxygen saturation ordered medications to maintain glucose within target range  - Assess barriers to adequate nutritional intake and initiate nutrition consult as needed  - Instruct patient on self management of diabetes  Outcome: Not Progressing

## 2021-11-16 PROCEDURE — 99233 SBSQ HOSP IP/OBS HIGH 50: CPT | Performed by: OTHER

## 2021-11-16 RX ORDER — CHLORDIAZEPOXIDE HYDROCHLORIDE 5 MG/1
5 CAPSULE, GELATIN COATED ORAL 3 TIMES DAILY
Status: DISCONTINUED | OUTPATIENT
Start: 2021-11-16 | End: 2021-11-18

## 2021-11-16 RX ORDER — LORAZEPAM 2 MG/ML
2 INJECTION INTRAMUSCULAR
Status: DISCONTINUED | OUTPATIENT
Start: 2021-11-16 | End: 2021-11-18

## 2021-11-16 RX ORDER — HALOPERIDOL 5 MG/ML
0.5 INJECTION INTRAMUSCULAR 4 TIMES DAILY
Status: DISCONTINUED | OUTPATIENT
Start: 2021-11-16 | End: 2021-11-18

## 2021-11-16 NOTE — PROGRESS NOTES
Patient is a 19year old  single male with history of polysubstance abuse and previous history of seizure who presented to the hospital with seizure–like activity and unresponsiveness.      Consult Duration     The patient seen for over 35-minute sx observed by family and mentor  Past inpatient: none  Past outpatient: SFBT therapy and psychiatry following recovery from a collapsed lung  Medication: possible anti anxiety medications 2-3 years ago but none currently     SOCIAL HISTORY:  Laurie Teague is a 2 injection 4 mg, 4 mg, Intravenous, Q6H PRN  Prochlorperazine Edisylate (COMPAZINE) injection 5 mg, 5 mg, Intravenous, Q8H PRN  LORazepam (ATIVAN) injection 2 mg, 2 mg, Intravenous, Q4H PRN      No medications prior to admission.       Allergies    Shrimp to leave against medical recomendation    Impression:     Impression:        Delirium due to polysubstance overdose  Episodic mood disorder. Polysubstance dependence.   Seizure [facility])      The patient with history of polysubstance abuse who presented to the Function Panel (7)      Phosphorus      Procalcitonin      Basic Metabolic Panel (8)      CBC With Differential With Platelet      Insulin      C-Peptide      Phosphorus      Rapid SARS-CoV-2 by PCR      Blood Culture      Meds This Visit:  Requested Presc

## 2021-11-16 NOTE — PROGRESS NOTES
BG reviewed  Patient has not been eating well hence back on D 10  Will follow peripherally    Kimi Fuentes

## 2021-11-16 NOTE — PLAN OF CARE
Braulio Ventura remained in bed today, he was very impulsive and agitated at times. He remains drowsy and is not able to stand alone safely, his glucose levels remained normal upon restarting D10 post hypoglycemic event.  I have tried educating him on the need to ea MD/LIP  - If seizure occurs, turn patient to side and suction secretions as needed  - Reorient patient post seizure  - Seizure pads on all 4 side rails  - Instruct patient/family to notify RN of any seizure activity  - Instruct patient/family to call for a patient on self management of diabetes  Outcome: Not Progressing

## 2021-11-16 NOTE — DIETARY NOTE
ADULT NUTRITION REASSESSMENT    Pt is at high nutrition risk. Pt meets severe malnutrition criteria.       CRITERIA FOR MALNUTRITION DIAGNOSIS:  Criteria for severe malnutrition diagnosis: chronic illness related to wt loss greater than 20% in 1 year and e family- Psych MD is adjusting meds and hopefully will help with agitation.    FOOD/NUTRITION RELATED HISTORY:  Appetite: Good  Intake: fair (excellent documentation by RN staff)   Intake Meeting Needs: No, but oral nutrition supplements (ONS) to maximize Lbs 90~ 1 year ago)        80 % UBW    WEIGHT HISTORY:  Patient Weight(s) for the past 336 hrs:   Weight   11/10/21 0005 55 kg (121 lb 4.1 oz)   11/09/21 1808 59 kg (130 lb)     Wt Readings from Last 10 Encounters:  11/10/21 : 55 kg (121 lb 4.1 oz)    NUTR

## 2021-11-16 NOTE — PLAN OF CARE
Problem: Patient Centered Care  Goal: Patient preferences are identified and integrated in the patient's plan of care  Description: Interventions:  - What would you like us to know as we care for you?   - Provide timely, complete, and accurate informatio needed  - Reorient patient post seizure  - Seizure pads on all 4 side rails  - Instruct patient/family to notify RN of any seizure activity  - Instruct patient/family to call for assistance with activity based on assessment  Outcome: Progressing     Proble diabetes  Outcome: Not Progressing  Note: Pt restarted on D10 yesterday d/t hypoglycemic episode and pt not eating much. Accuchecks q4hrs.

## 2021-11-16 NOTE — PROGRESS NOTES
[facility] Hospitalist Progress Note     CC: Hospital Follow up    PCP: None Pcp       Assessment/Plan:   Rafaela Johnson a 18 yo M w/ PMHx of seizure, substance abuse who presented w/ possible witnessed seizure and unresponsiveness, he was intubated and started 2233 ml   Output 2450 ml   Net -217 ml       Last 3 Weights  11/10/21 0005 : 121 lb 4.1 oz (55 kg)  11/09/21 1808 : 130 lb (59 kg)      Exam:  Gen: awake  Pulm: Lungs clear, normal respiratory effort  CV: Heart with regular rate and rhythm  Abd: Abdomen so

## 2021-11-16 NOTE — PLAN OF CARE
Problem: NEUROLOGICAL - ADULT  Goal: Absence of seizures  Description: INTERVENTIONS  - Monitor for seizure activity  - Administer anti-seizure medications as ordered  - Monitor neurological status  Outcome: Progressing  Goal: Remains free of injury rela interventions  Outcome: Progressing     Problem: Diabetes/Glucose Control  Goal: Glucose maintained within prescribed range  Description: INTERVENTIONS:  - Monitor Blood Glucose as ordered  - Assess for signs and symptoms of hyperglycemia and hypoglycemia

## 2021-11-16 NOTE — PAYOR COMM NOTE
--------------  CONTINUED STAY REVIEW    Payor: MIND[employer] [address] #:  [id number]  Authorization Number:  [id number]    Admit date: 11/10/21  Admit time: 12:01 AM    Admitting Physician: Cindy Srinivasan DO  Attending Physician:  Zacarias Rivera DO  Primary Care Valproate Sodium (DEPACON) 500 mg in sodium chloride 0.9% 100 mL IVPB     Date Action Dose Route User    11/16/2021 1142 New Bag 500 mg Intravenous Jaimee Phelan RN    11/16/2021 0117 New Bag 500 mg Intravenous Carmen Lefort, RN          Vitals 11/15/21 0000 97.8 °F (36.6 °C) 75 13 104/53 97 % — None (Room air) — TC        CIWA Scores (since admission)     None        11/16 HOSPITALIST NOTE     Assessment/Plan:   Gilda bond 18 yo M w/ PMHx of seizure, substance abuse who presented w/ possi 11/16/2021 0927  Last data filed at 11/16/2021 0800      Gross per 24 hour   Intake 2233 ml   Output 2450 ml   Net -217 ml         Last 3 Weights  11/10/21 0005 : 121 lb 4.1 oz (55 kg)  11/09/21 1808 : 130 lb (59 kg)        Exam:  Gen: awake  Pulm: Lungs c

## 2021-11-16 NOTE — PHYSICAL THERAPY NOTE
PHYSICAL THERAPY EVALUATION - INPATIENT     Room Number: 226/226-A  Evaluation Date: 11/16/2021  Type of Evaluation: Initial   Physician Order: PT Eval and Treat    Presenting Problem: seizure, polysubstance abuse     Reason for Therapy: Mobility Dysfunct RN to use one assist.     Patient's current functional deficits include decreased activity tolerance, balance, gait, stair negotiation, which are below the patient's pre-admission status.      The patient's Approx Degree of Impairment: 46.58% has been calcu past medical history on file. Past Surgical History  No past surgical history on file.     HOME SITUATION  Home Situation  Type of Home: Apartment  Home Layout: One level  Stairs to Bedroom: 0  Lives With: Other (Comment) (sister)  Drives: Yes  Patient O hospital room?: A Little   Help from Another: Climbing 3-5 steps with a railing?: A Little     AM-PAC Score:  Raw Score: 18   Approx Degree of Impairment: 46.58%   Standardized Score (AM-PAC Scale): 43.63   CMS Modifier (G-Code): CK    FUNCTIONAL ABILITY S

## 2021-11-17 NOTE — PROGRESS NOTES
[facility] Hospitalist Progress Note     CC: Hospital Follow up    PCP: None Pcp       Assessment/Plan:   Lb Major a 20 yo M w/ PMHx of seizure, substance abuse who presented w/ possible witnessed seizure and unresponsiveness, he was intubated and started source Temporal, resp. rate 15, height 5' 10\" (1.778 m), weight 124 lb 12.5 oz (56.6 kg), SpO2 98 %.     Temp:  [97 °F (36.1 °C)-97.9 °F (36.6 °C)] 97.4 °F (36.3 °C)  Pulse:  [] 79  Resp:  [12-29] 15  BP: ()/(51-78) 102/69      Intake/Output:

## 2021-11-17 NOTE — OCCUPATIONAL THERAPY NOTE
OCCUPATIONAL THERAPY EVALUATION - INPATIENT     Room Number: 226/226-A  Evaluation Date: 11/16/2021  Type of Evaluation: Initial       Physician Order: IP Consult to Occupational Therapy  Reason for Therapy: ADL/IADL Dysfunction and Discharge Planning    O MEDICAL/SOCIAL HISTORY     Problem List  Principal Problem:    Seizure [facility])  Active Problems:    Polysubstance abuse ([facility] [address]    Polysubstance dependence ([facility] [address]    Delirium due to another medical condition    Episodic mood disorder [facility])      Past Medical His week

## 2021-11-17 NOTE — OCCUPATIONAL THERAPY NOTE
OCCUPATIONAL THERAPY TREATMENT NOTE - INPATIENT        Room Number: 226/226-A                Problem List  Principal Problem:    Seizure [facility])  Active Problems:    Polysubstance abuse ([address]    Polysubstance dependence ([facility] [address]    Delirium due to another medic Activity Short Form  How much help from another person does the patient currently need…  -   Putting on and taking off regular lower body clothing?: A Little  -   Bathing (including washing, rinsing, drying)?: A Little  -   Toileting, which includes using

## 2021-11-17 NOTE — PLAN OF CARE
Mary Daniel is still requiring the restraints. He seems unsteady on his feet when he stands to void.  He continues to say he \"wants to go home\" and has been trying to gain more autonomy in hospital but not by complying with care, or speaking up when asked to re

## 2021-11-17 NOTE — PLAN OF CARE
Drowsy from IV haldol overnight. Unsteady on feet, transferred to chair with assistance. Veronica Glover to be discharged from the hospital, \"how do I get them to let me out of here. \"  IV Zosyn and D10 infusing as ordered.  Drinking water, too drowsy to offer break

## 2021-11-17 NOTE — PLAN OF CARE
Cabrera Porter is still requiring the Posey vest and the bilateral soft wrist restraints for his safety to keep him from removing medical equipment.       Problem: Safety Risk - Non-Violent Restraints  Goal: Patient will remain free from self-harm  Description: INTE

## 2021-11-18 VITALS
BODY MASS INDEX: 17.86 KG/M2 | HEART RATE: 100 BPM | OXYGEN SATURATION: 76 % | WEIGHT: 124.75 LBS | RESPIRATION RATE: 23 BRPM | TEMPERATURE: 97 F | SYSTOLIC BLOOD PRESSURE: 113 MMHG | HEIGHT: 70 IN | DIASTOLIC BLOOD PRESSURE: 83 MMHG

## 2021-11-18 RX ORDER — ESCITALOPRAM OXALATE 5 MG/1
5 TABLET ORAL NIGHTLY
Qty: 30 TABLET | Refills: 0 | Status: SHIPPED | OUTPATIENT
Start: 2021-11-18

## 2021-11-18 RX ORDER — VALPROIC ACID 250 MG/1
500 CAPSULE, LIQUID FILLED ORAL 2 TIMES DAILY
Status: DISCONTINUED | OUTPATIENT
Start: 2021-11-18 | End: 2021-11-18

## 2021-11-18 RX ORDER — FOLIC ACID 1 MG/1
1 TABLET ORAL DAILY
Status: DISCONTINUED | OUTPATIENT
Start: 2021-11-19 | End: 2021-11-18

## 2021-11-18 RX ORDER — VALPROIC ACID 250 MG/1
500 CAPSULE, LIQUID FILLED ORAL 2 TIMES DAILY
Qty: 120 CAPSULE | Refills: 0 | Status: SHIPPED | OUTPATIENT
Start: 2021-11-18

## 2021-11-18 RX ORDER — HALOPERIDOL 5 MG/ML
0.5 INJECTION INTRAMUSCULAR EVERY 6 HOURS PRN
Status: DISCONTINUED | OUTPATIENT
Start: 2021-11-18 | End: 2021-11-18

## 2021-11-18 RX ORDER — MELATONIN
100 DAILY
Status: DISCONTINUED | OUTPATIENT
Start: 2021-11-19 | End: 2021-11-18

## 2021-11-18 RX ORDER — CHLORDIAZEPOXIDE HYDROCHLORIDE 5 MG/1
5 CAPSULE, GELATIN COATED ORAL 3 TIMES DAILY PRN
Status: DISCONTINUED | OUTPATIENT
Start: 2021-11-18 | End: 2021-11-18

## 2021-11-18 NOTE — PLAN OF CARE
Alert, calm and cooperative today. Myranda Gutierrez to return home. \"I will do whatever you want if I can go home. \" Tolerating PO medication and eating well. Discharge to home with sister.  Mental health & addiction resources per psychiatry team are in the dischar activity, document and report duration and description of seizure to MD/LIP  - If seizure occurs, turn patient to side and suction secretions as needed  - Reorient patient post seizure  - Seizure pads on all 4 side rails  - Instruct patient/family to notif reorientation  - Promote wakefulness i.e. lights on, blinds open  - Promote sleep, encourage patient's normal rest cycle i.e. lights off, TV off, minimize noise and interruptions  - Encourage family to assist in orientation and promotion of home routines

## 2021-11-18 NOTE — DISCHARGE SUMMARY
General Medicine Discharge Summary     Patient ID:  Orlando Santiago  19year old  3/24/2000    Admit date: 11/9/2021    Discharge date and time: 11/18/21    Attending Physician: DO Chuy Patel days received, completed on 11/17     Hypoglycemia - likely nutritional   -follow blood sugars closely, appreciate endocrine consult     Patient was evaluated by physical therapy and tolerated.  Recommendation was for discharge home with 24 hour supervision

## 2021-11-18 NOTE — PROGRESS NOTES
[facility] Hospitalist Progress Note     CC: Hospital Follow up    PCP: None Pcp       Assessment/Plan:   Lb Major a 18 yo M w/ PMHx of seizure, substance abuse who presented w/ possible witnessed seizure and unresponsiveness, he was intubated and started source Temporal, resp. rate 25, height 5' 10\" (1.778 m), weight 124 lb 12.5 oz (56.6 kg), SpO2 99 %. Temp:  [97 °F (36.1 °C)-98 °F (36.7 °C)] 97.1 °F (36.2 °C)  Pulse:  [53-83] 62  Resp:  [11-26] 25  BP: ()/(40-64) 98/61      Intake/Output:     In

## 2021-11-18 NOTE — PLAN OF CARE
Problem: Patient Centered Care  Goal: Patient preferences are identified and integrated in the patient's plan of care  Description: Interventions:  - What would you like us to know as we care for you?  \"I want to go home\"  - Provide timely, complete, an for assistance with activity based on assessment  Outcome: Progressing     Problem: RESPIRATORY - ADULT  Goal: Achieves optimal ventilation and oxygenation  Description: INTERVENTIONS:  - Assess for changes in respiratory status  - Assess for changes in me Glucose maintained within prescribed range  Description: INTERVENTIONS:  - Monitor Blood Glucose as ordered  - Assess for signs and symptoms of hyperglycemia and hypoglycemia  - Administer ordered medications to maintain glucose within target range  - Asse

## 2021-11-18 NOTE — BH PROGRESS NOTE
Behavioral Health Note:     CHIEF COMPLAINT:   Psych Liaison met with Tod Hodges today for follow up to discuss continued need for medical admission, reassurance, and emotional support.     Tod Hodges today reports that being in hospital feels like a constant remin recovery from a collapsed lung  Medication: possible anti anxiety medications 2-3 years ago but none currently     SOCIAL HISTORY:  Dinah Monzon is a 19year old male who lives in [place] with his sister and her family for the past 2 years following his family

## 2021-11-18 NOTE — PHYSICAL THERAPY NOTE
PHYSICAL THERAPY TREATMENT NOTE - INPATIENT     Room Number: 600/367-I       Presenting Problem: seizure, polysubstance abuse       Problem List  Principal Problem:    Seizure [facility])  Active Problems:    Polysubstance abuse ([address]    Polysubstance dependence 0    BALANCE  Static Sitting: Good  Dynamic Sitting: Good  Static Standing: Fair +  Dynamic Standing: Fair    ACTIVITY TOLERANCE  Post-activity, seated:  HR 96 bpm  *no dizziness or SOB, mild fatigue    AM-PAC '6-Clicks' INPATIENT SHORT FORM - BASIC MOBILI device: none at assistance level: supervision   Goal #3   Current Status Pt ambulates 250' at [facility] to SBA    Goal #4 Patient will negotiate 15 stairs/one curb w/ assistive device and supervision   Goal #4   Current Status Not initiated; no stairs at d/c loc

## 2021-11-19 NOTE — PAYOR COMM NOTE
PLEASE FAX DETERMINATION  ADMIT REVIEW HAS BEEN SUBMITTED  THANKS!     DISCHARGE REVIEW    Payor: Ankita Garcia #:  [id number]  Authorization Number: [id number]    Admit date: 11/10/21  Admit time:  12:01 AM  Discharge Date: 11/18/2021  3:23 PM     Admit food intake  -intermittently required D10 for low BS, attempt to wean     Delirium  Episodic mood disorder  Polysubstance abuse   -appreciate psychiatry consultation   -suspect delirium from acute drug intoxicatons, rather than acute psychosis  -still not

## 2021-12-23 NOTE — PAYOR COMM NOTE
--------------  DISCHARGE REVIEW    Payor: John [place] #:  [id number]  Authorization Number: [id number]    Admit date: 11/9/21  Admit time:   7:41 PM  Discharge Date: 11/18/2021  3:23 PM     Please confirm auth beginning 11/9/21, current Rebekah Bhardwaj is unresponsive. Upon paramedic arrival the patient was found to have pinpoint pupils and was unresponsive. He was given intranasal Narcan initially. An IV was established and he was given IV Narcan prior to coming to the ED.   Upon arrival in the emergency (*)       GFR, Non- 49 (*)       GFR, -American 57 (*)       All other components within normal limits   DRUG SCREEN 7 W/OUT CONFIRMATION, URINE - Abnormal; Notable for the following components:     Amphetamine Urine Presumed Positiv mask.  The patient was orally endotracheally intubated under glidescope visualization with a 7.5 ETT. There was good misting on the tube; breath sounds were auscultated equally bilaterally; good color change with [place] CO2 detector.   C[facility]
